# Patient Record
Sex: FEMALE | Race: WHITE | NOT HISPANIC OR LATINO
[De-identification: names, ages, dates, MRNs, and addresses within clinical notes are randomized per-mention and may not be internally consistent; named-entity substitution may affect disease eponyms.]

---

## 2017-11-27 PROBLEM — Z86.010 PERSONAL HISTORY OF COLONIC POLYPS: Status: ACTIVE | Noted: 2017-11-27

## 2017-11-28 PROCEDURE — 81001 URINALYSIS AUTO W/SCOPE: CPT | Performed by: INTERNAL MEDICINE

## 2017-11-29 PROCEDURE — 87086 URINE CULTURE/COLONY COUNT: CPT | Performed by: INTERNAL MEDICINE

## 2018-01-05 ENCOUNTER — HOSPITAL (OUTPATIENT)
Dept: OTHER | Age: 83
End: 2018-01-05
Attending: OBSTETRICS & GYNECOLOGY

## 2018-02-02 PROBLEM — N64.4 BREAST PAIN: Status: ACTIVE | Noted: 2018-02-02

## 2018-11-27 PROBLEM — M54.50 ACUTE RIGHT-SIDED LOW BACK PAIN WITHOUT SCIATICA: Status: ACTIVE | Noted: 2018-11-27

## 2018-11-28 PROCEDURE — 81001 URINALYSIS AUTO W/SCOPE: CPT | Performed by: INTERNAL MEDICINE

## 2018-12-04 PROCEDURE — 81003 URINALYSIS AUTO W/O SCOPE: CPT | Performed by: INTERNAL MEDICINE

## 2019-01-08 PROCEDURE — 81001 URINALYSIS AUTO W/SCOPE: CPT | Performed by: INTERNAL MEDICINE

## 2019-01-08 PROCEDURE — 87086 URINE CULTURE/COLONY COUNT: CPT | Performed by: INTERNAL MEDICINE

## 2019-09-24 PROBLEM — N64.4 BREAST PAIN: Status: RESOLVED | Noted: 2018-02-02 | Resolved: 2019-09-24

## 2019-09-24 PROBLEM — M54.50 ACUTE RIGHT-SIDED LOW BACK PAIN WITHOUT SCIATICA: Status: RESOLVED | Noted: 2018-11-27 | Resolved: 2019-09-24

## 2019-09-30 ENCOUNTER — HOSPITAL (OUTPATIENT)
Dept: OTHER | Age: 84
End: 2019-09-30
Attending: OBSTETRICS & GYNECOLOGY

## 2019-10-03 PROCEDURE — 87086 URINE CULTURE/COLONY COUNT: CPT | Performed by: INTERNAL MEDICINE

## 2019-10-04 PROCEDURE — 82330 ASSAY OF CALCIUM: CPT | Performed by: INTERNAL MEDICINE

## 2019-10-04 PROCEDURE — 36415 COLL VENOUS BLD VENIPUNCTURE: CPT | Performed by: INTERNAL MEDICINE

## 2019-10-07 PROCEDURE — 81001 URINALYSIS AUTO W/SCOPE: CPT | Performed by: INTERNAL MEDICINE

## 2021-12-07 ENCOUNTER — OFFICE VISIT (OUTPATIENT)
Dept: UROLOGY | Facility: HOSPITAL | Age: 86
End: 2021-12-07
Attending: OBSTETRICS & GYNECOLOGY
Payer: MEDICARE

## 2021-12-07 VITALS
DIASTOLIC BLOOD PRESSURE: 64 MMHG | HEIGHT: 61 IN | RESPIRATION RATE: 20 BRPM | WEIGHT: 191 LBS | SYSTOLIC BLOOD PRESSURE: 122 MMHG | BODY MASS INDEX: 36.06 KG/M2

## 2021-12-07 DIAGNOSIS — N39.41 URGE INCONTINENCE: Primary | ICD-10-CM

## 2021-12-07 DIAGNOSIS — N39.3 FEMALE STRESS INCONTINENCE: ICD-10-CM

## 2021-12-07 DIAGNOSIS — N95.2 POSTMENOPAUSAL ATROPHIC VAGINITIS: ICD-10-CM

## 2021-12-07 DIAGNOSIS — R35.1 NOCTURIA: ICD-10-CM

## 2021-12-07 DIAGNOSIS — N81.84 PELVIC MUSCLE WASTING: ICD-10-CM

## 2021-12-07 PROCEDURE — 87077 CULTURE AEROBIC IDENTIFY: CPT | Performed by: OBSTETRICS & GYNECOLOGY

## 2021-12-07 PROCEDURE — 87086 URINE CULTURE/COLONY COUNT: CPT | Performed by: OBSTETRICS & GYNECOLOGY

## 2021-12-07 PROCEDURE — 81002 URINALYSIS NONAUTO W/O SCOPE: CPT | Performed by: OBSTETRICS & GYNECOLOGY

## 2021-12-07 PROCEDURE — 81001 URINALYSIS AUTO W/SCOPE: CPT | Performed by: OBSTETRICS & GYNECOLOGY

## 2021-12-07 PROCEDURE — 99212 OFFICE O/P EST SF 10 MIN: CPT

## 2021-12-07 RX ORDER — ESTRADIOL 0.1 MG/G
CREAM VAGINAL
Qty: 43 G | Refills: 3 | Status: SHIPPED | OUTPATIENT
Start: 2021-12-07 | End: 2022-01-27

## 2021-12-07 NOTE — PROGRESS NOTES
Ranjan Landers,   12/7/2021     Referred by Dr. Lindsay Tellez  Pt here with     Patient presents with:   Incontinence      HPI:  + SHAREE  + UUI  UUI worse than SHAREE  Nocturia x1  Denies prolapse  hyst for fibroids  Not sexually active, n/a dyspareunia  irreg [facility]   • [facility] IV ANTIBIOTIC SURGICAL SITE INFECTION PROPHYLAXIS.   6/19/2013    Procedure: COLONOSCOPY, POSSIBLE BIOPSY, POSSIBLE POLYPECTOMY 85321;  Surgeon: Pancho Torres MD;  Location: [address] [place] [address], 12 point review of systems was completed. Pertinent positives noted in the the HPI.   No CP  No SOB    GENERAL EXAM:  GENERAL:  Alert and Oriented, and NAD  HEENT:  Normal, no lesions  LUNGS:  Normal effort  HEART:  RRR  ABDOMEN: soft, no mass, no hernia Plan, Surgical:   None    Pt verbalizes understanding of all above discussed information. All questions answered. She agrees to plan    Return in about 6 weeks (around 1/18/2022) for Bladder symptom follow up, sooner prn .     Trish Waller DO, FACOG, FA

## 2021-12-07 NOTE — PATIENT INSTRUCTIONS
[facility]  [facility]    Fingertip Application Method for Estrogen Vaginal Cream    1. Wash you hands with soap and water and dry thoroughly.     2.  Squeeze out enough cream from the tube to cover 1/2 of your index fin dose---approximately one-sixth of the typical dose. You may need to increase the amount depending on your results. _XXX____Miralax    Miralax is a laxative available over the counter. It can be used on a long-term basis if necessary.   The usual star

## 2021-12-08 ENCOUNTER — TELEPHONE (OUTPATIENT)
Dept: UROLOGY | Facility: HOSPITAL | Age: 86
End: 2021-12-08

## 2021-12-08 NOTE — TELEPHONE ENCOUNTER
Pt called with questions about bowel regimen. Called back, [facility] with information requested to take bowel regimen. Pt to call back with questions.

## 2021-12-08 NOTE — PROGRESS NOTES
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   She is having operative report faxed to our office

## 2021-12-09 ENCOUNTER — TELEPHONE (OUTPATIENT)
Dept: UROLOGY | Facility: HOSPITAL | Age: 86
End: 2021-12-09

## 2021-12-09 DIAGNOSIS — R39.15 URINARY URGENCY: Primary | ICD-10-CM

## 2021-12-09 RX ORDER — CEPHALEXIN 500 MG/1
500 CAPSULE ORAL 2 TIMES DAILY
Qty: 14 CAPSULE | Refills: 0 | Status: SHIPPED | OUTPATIENT
Start: 2021-12-09 | End: 2021-12-16

## 2021-12-09 NOTE — TELEPHONE ENCOUNTER
TC to pt w/ test results  UCX + for non hemolytic strep (GBS)   Informed pt typically normal anali, no tx necessary unless symptomatic  Pt feels urgency has been worse some days, would like to try treatment   Will rx keflex 500mg bid x 7 days to pt's prefe

## 2022-01-27 ENCOUNTER — VIRTUAL PHONE E/M (OUTPATIENT)
Dept: UROLOGY | Facility: HOSPITAL | Age: 87
End: 2022-01-27
Attending: STUDENT IN AN ORGANIZED HEALTH CARE EDUCATION/TRAINING PROGRAM
Payer: MEDICARE

## 2022-01-27 VITALS — HEIGHT: 61 IN | WEIGHT: 190 LBS | BODY MASS INDEX: 35.87 KG/M2

## 2022-01-27 DIAGNOSIS — R39.15 URINARY URGENCY: Primary | ICD-10-CM

## 2022-01-27 DIAGNOSIS — N81.84 PELVIC MUSCLE WASTING: ICD-10-CM

## 2022-01-27 DIAGNOSIS — N95.2 POSTMENOPAUSAL ATROPHIC VAGINITIS: ICD-10-CM

## 2022-01-27 DIAGNOSIS — R35.1 NOCTURIA: ICD-10-CM

## 2022-01-27 DIAGNOSIS — N39.41 URGE INCONTINENCE: ICD-10-CM

## 2022-01-27 DIAGNOSIS — N39.3 FEMALE STRESS INCONTINENCE: ICD-10-CM

## 2022-01-27 RX ORDER — ESTRADIOL 0.1 MG/G
CREAM VAGINAL
Qty: 42.5 G | Refills: 3 | Status: SHIPPED | OUTPATIENT
Start: 2022-01-27

## 2022-01-27 NOTE — PROGRESS NOTES
Patient to follow up UUI/SHAREE  Given circumstances surrounding COVID-19 this visit is being conducted as a televisit with pt's consent.     She is currently using bowel management, bladder diet/drill    She reports + improvement   Bowels improved w/ fiber/mi

## 2023-10-11 ENCOUNTER — TELEPHONE (OUTPATIENT)
Dept: UROLOGY | Facility: CLINIC | Age: 88
End: 2023-10-11

## 2023-11-29 ENCOUNTER — TELEPHONE (OUTPATIENT)
Dept: UROLOGY | Facility: CLINIC | Age: 88
End: 2023-11-29

## 2023-11-29 NOTE — TELEPHONE ENCOUNTER
Spoke with pt to remind of yearly appt on 12/5/23 @ 10:30a with Dr Rayray Ramsay here in our [facility]. Patient said she will think about whether she will come and will call to cancel if she decides not to.

## 2023-12-04 ENCOUNTER — TELEPHONE (OUTPATIENT)
Dept: UROLOGY | Facility: CLINIC | Age: 88
End: 2023-12-04

## 2023-12-05 ENCOUNTER — OFFICE VISIT (OUTPATIENT)
Dept: UROLOGY | Facility: CLINIC | Age: 88
End: 2023-12-05
Attending: OBSTETRICS & GYNECOLOGY
Payer: MEDICARE

## 2023-12-05 VITALS — HEIGHT: 61 IN | TEMPERATURE: 97 F | WEIGHT: 190.81 LBS | BODY MASS INDEX: 36.02 KG/M2

## 2023-12-05 DIAGNOSIS — R35.1 NOCTURIA: ICD-10-CM

## 2023-12-05 DIAGNOSIS — N81.84 PELVIC MUSCLE WASTING: Primary | ICD-10-CM

## 2023-12-05 DIAGNOSIS — N95.2 POSTMENOPAUSAL ATROPHIC VAGINITIS: ICD-10-CM

## 2023-12-05 PROCEDURE — 99212 OFFICE O/P EST SF 10 MIN: CPT

## 2023-12-05 RX ORDER — ESTRADIOL 0.1 MG/G
CREAM VAGINAL
Qty: 42.5 G | Refills: 3 | Status: SHIPPED | OUTPATIENT
Start: 2023-12-05